# Patient Record
Sex: MALE | Race: NATIVE HAWAIIAN OR OTHER PACIFIC ISLANDER
[De-identification: names, ages, dates, MRNs, and addresses within clinical notes are randomized per-mention and may not be internally consistent; named-entity substitution may affect disease eponyms.]

---

## 2019-07-11 ENCOUNTER — HOSPITAL ENCOUNTER (EMERGENCY)
Dept: HOSPITAL 76 - ED | Age: 39
Discharge: HOME | End: 2019-07-11
Payer: COMMERCIAL

## 2019-07-11 VITALS — SYSTOLIC BLOOD PRESSURE: 127 MMHG | DIASTOLIC BLOOD PRESSURE: 77 MMHG

## 2019-07-11 DIAGNOSIS — N20.0: Primary | ICD-10-CM

## 2019-07-11 LAB
ALBUMIN DIAFP-MCNC: 4.6 G/DL (ref 3.2–5.5)
ALBUMIN/GLOB SERPL: 1.1 {RATIO} (ref 1–2.2)
ALP SERPL-CCNC: 90 IU/L (ref 42–121)
ALT SERPL W P-5'-P-CCNC: 67 IU/L (ref 10–60)
ANION GAP SERPL CALCULATED.4IONS-SCNC: 13 MMOL/L (ref 6–13)
AST SERPL W P-5'-P-CCNC: 35 IU/L (ref 10–42)
BASOPHILS NFR BLD AUTO: 0.1 10^3/UL (ref 0–0.1)
BASOPHILS NFR BLD AUTO: 0.6 %
BILIRUB BLD-MCNC: 1.2 MG/DL (ref 0.2–1)
BUN SERPL-MCNC: 20 MG/DL (ref 6–20)
CALCIUM UR-MCNC: 9.5 MG/DL (ref 8.5–10.3)
CHLORIDE SERPL-SCNC: 105 MMOL/L (ref 101–111)
CLARITY UR REFRACT.AUTO: (no result)
CO2 SERPL-SCNC: 24 MMOL/L (ref 21–32)
CREAT SERPLBLD-SCNC: 1 MG/DL (ref 0.6–1.2)
EOSINOPHIL # BLD AUTO: 0.2 10^3/UL (ref 0–0.7)
EOSINOPHIL NFR BLD AUTO: 2 %
ERYTHROCYTE [DISTWIDTH] IN BLOOD BY AUTOMATED COUNT: 11.7 % (ref 12–15)
GFRSERPLBLD MDRD-ARVRAT: 85 ML/MIN/{1.73_M2} (ref 89–?)
GLOBULIN SER-MCNC: 4 G/DL (ref 2.1–4.2)
GLUCOSE SERPL-MCNC: 130 MG/DL (ref 70–100)
GLUCOSE UR QL STRIP.AUTO: NEGATIVE MG/DL
HGB UR QL STRIP: 14.6 G/DL (ref 14–18)
KETONES UR QL STRIP.AUTO: NEGATIVE MG/DL
LIPASE SERPL-CCNC: 36 U/L (ref 22–51)
LYMPHOCYTES # SPEC AUTO: 3.7 10^3/UL (ref 1.5–3.5)
LYMPHOCYTES NFR BLD AUTO: 43.9 %
MCH RBC QN AUTO: 30.4 PG (ref 27–31)
MCHC RBC AUTO-ENTMCNC: 33.4 G/DL (ref 32–36)
MCV RBC AUTO: 90.9 FL (ref 80–94)
MONOCYTES # BLD AUTO: 0.6 10^3/UL (ref 0–1)
MONOCYTES NFR BLD AUTO: 7.5 %
MUCOUS THREADS URNS QL MICRO: (no result)
NEUTROPHILS # BLD AUTO: 3.8 10^3/UL (ref 1.5–6.6)
NEUTROPHILS # SNV AUTO: 8.3 X10^3/UL (ref 4.8–10.8)
NEUTROPHILS NFR BLD AUTO: 45.6 %
NITRITE UR QL STRIP.AUTO: NEGATIVE
PDW BLD AUTO: 9.5 FL (ref 7.4–11.4)
PH UR STRIP.AUTO: 5.5 PH (ref 5–7.5)
PLATELET # BLD: 366 10^3/UL (ref 130–450)
PROT SPEC-MCNC: 8.6 G/DL (ref 6.7–8.2)
PROT UR STRIP.AUTO-MCNC: 30 MG/DL
RBC # UR STRIP.AUTO: (no result) /UL
RBC # URNS HPF: (no result) /HPF (ref 0–5)
RBC MAR: 4.81 10^6/UL (ref 4.7–6.1)
SODIUM SERPLBLD-SCNC: 142 MMOL/L (ref 135–145)
SP GR UR STRIP.AUTO: >=1.03 (ref 1–1.03)
SQUAMOUS URNS QL MICRO: (no result)
UROBILINOGEN UR QL STRIP.AUTO: (no result) E.U./DL
UROBILINOGEN UR STRIP.AUTO-MCNC: NEGATIVE MG/DL

## 2019-07-11 PROCEDURE — 74176 CT ABD & PELVIS W/O CONTRAST: CPT

## 2019-07-11 PROCEDURE — 96374 THER/PROPH/DIAG INJ IV PUSH: CPT

## 2019-07-11 PROCEDURE — 36415 COLL VENOUS BLD VENIPUNCTURE: CPT

## 2019-07-11 PROCEDURE — 96361 HYDRATE IV INFUSION ADD-ON: CPT

## 2019-07-11 PROCEDURE — 81003 URINALYSIS AUTO W/O SCOPE: CPT

## 2019-07-11 PROCEDURE — 87086 URINE CULTURE/COLONY COUNT: CPT

## 2019-07-11 PROCEDURE — 81001 URINALYSIS AUTO W/SCOPE: CPT

## 2019-07-11 PROCEDURE — 80053 COMPREHEN METABOLIC PANEL: CPT

## 2019-07-11 PROCEDURE — 83690 ASSAY OF LIPASE: CPT

## 2019-07-11 PROCEDURE — 85025 COMPLETE CBC W/AUTO DIFF WBC: CPT

## 2019-07-11 PROCEDURE — 99284 EMERGENCY DEPT VISIT MOD MDM: CPT

## 2019-07-11 NOTE — CT REPORT
Reason:  L flank pain; r/o kidney stone

Procedure Date:  07/11/2019   

Accession Number:  [id number] / [id number]                    

Procedure:  CT  - Abdomen/Pelvis WO CPT Code:  

 

FULL RESULT:

 

 

EXAM:

CT ABDOMEN AND PELVIS (CT KUB)

 

EXAM DATE: 7/11/2019 06:05 AM.

 

CLINICAL HISTORY: Left flank pain; rule out kidney stone.

 

COMPARISONS: None.

 

TECHNIQUE: Routine axial helical CT imaging was performed through the 

abdomen and pelvis without IV contrast. Reconstructions: Coronal and 

sagittal.

 

In accordance with CT protocol optimization, one or more of the following 

dose reduction techniques were utilized for this exam: automated exposure 

control, adjustment of mA and/or KV based on patient size, or use of 

iterative reconstructive technique.

 

FINDINGS:

Lung Bases: Unremarkable with 2 mm right middle lobe scarlike nodular 

focus.

 

Right Kidney/Ureter: No stones, hydronephrosis, or hydroureter.

 

Left Kidney/Ureter: Nonobstructing 4 x 2 mm proximal to mid left ureteral 

stone. Otherwise grossly unremarkable.

 

Other Abdominal Organs: Noncontrast images of the abdominal organs are 

grossly unremarkable with exception of fatty liver.

 

Peritoneal Cavity: No free fluid, free air or maggy adenopathy. No 

excessive stool burden. Bowel is grossly unremarkable.

 

Pelvic Organs: No bladder stones or gross wall thickening. Noncontrast 

images of the visualized pelvic organs are unremarkable.

 

Vasculature: Unremarkable.

 

Other: None.

IMPRESSION:

1. Nonobstructing 4 x 2 mm proximal to mid left ureteral stone.

2. Fatty liver.

 

RADIA

## 2025-02-23 NOTE — ED PHYSICIAN DOCUMENTATION
PD HPI ABD PAIN





- Stated complaint


Stated Complaint: ABD/BACK PAIN





- History obtained from


History obtained from: Patient





- History of Present Illness


Timing - onset: How many hours ago (1.5)


Timing - duration: Hours (1.5)


Timing - details: Abrupt onset


Pain level max: 10


Pain level now: 8


Quality: Sharp


Location: LUQ, LLQ


Radiation: Left flank


Associated symptoms: Hematochezia (2 weeks ago he saw bright red blood on his 

stool about 3 times .).  No: Fever, Nausea, Vomiting, Diarrhea, Constipation, 

Dysuria, Hematuria, Chest pain, Dizzy, Near syncope / syncope


Similar symptoms before: Has not had sx before





- Additional information


Additional information: 





This is a 38-year-old man who presents with his wife complains that he was 

awakened an hour and a half prior to presentation with complaints of pain in his

left abdomen with that was sharp and radiating to the left flank.  Is a constant

pain that was a 10 out of 10 it slightly improved now with a 7-8 out of 10.  He 

has not taken any medications for it.  He did urinate at home the urine was very

dark but no maggy blood in it.  He was sweaty on the way here but denies fever. 

He has had no nausea vomiting or diarrhea.  Approximately 2 weeks ago the 

patient noticed some bright red blood in his stools 3 times.  Denied 

constipation and has not seen any further episodes of bloody stool.  Is never 

had a colonoscopy.  Denies history of kidney stones.  The pain does not radiate 

down his leg.  Patient is  and is in the [employer].





Review of Systems


Constitutional: reports: Sweats.  denies: Fever


Eyes: denies: Decreased vision


Cardiac: denies: Chest pain / pressure, Palpitations


Respiratory: denies: Dyspnea, Cough


GI: reports: Abdominal Pain.  denies: Nausea, Vomiting, Diarrhea, Hematemesis


: denies: Dysuria, Frequency, Hesitancy


Skin: denies: Rash


Musculoskeletal: reports: Back pain


Endocrine: reports: Other (Not diabetic)





PD PAST MEDICAL HISTORY





- Present Medications


Home Medications: 


                                Ambulatory Orders











 Medication  Instructions  Recorded  Confirmed


 


Hydrocodone/Acetaminophen 1 - 2 each PO Q6H PRN #14 tablet 07/11/19 





[Hydrocodon-Acetaminophen 5-325]   


 


Ibuprofen [Motrin] 800 mg PO Q8H PRN #30 tablet 07/11/19 


 


Tamsulosin [Flomax] 0.4 mg PO DAILY #10 capsule 07/11/19 














- Allergies


Allergies/Adverse Reactions: 


                                    Allergies











Allergy/AdvReac Type Severity Reaction Status Date / Time


 


No Known Drug Allergies Allergy   Verified 07/11/19 04:50














PD ED PE NORMAL





- Vitals


Vital signs reviewed: Yes





- General


General: Alert and oriented X 3, Other (Appears uncomfortable.)





- HEENT


HEENT: Atraumatic, PERRL





- Neck


Neck: Thyroid normal, No JVD





- Cardiac


Cardiac: RRR, No murmur





- Respiratory


Respiratory: No respiratory distress, Clear bilaterally





- Abdomen


Abdomen: Normal bowel sounds, Soft, Other (He has voluntary guarding with 

tenderness along the left abdomen.)





- Back


Back: Other (There is left costovertebral angle tenderness.)





- Derm


Derm: Normal color, Warm and dry, No rash





- Extremities


Extremities: No deformity, No edema





- Neuro


Neuro: Alert and oriented X 3, CNs 2-12 intact, No motor deficit, No sensory 

deficit, Normal speech





Results





- Vitals


Vitals: 


                               Vital Signs - 24 hr











  07/11/19 07/11/19





  04:40 05:23


 


Temperature 36.3 C L 


 


Heart Rate 73 70


 


Respiratory 20 14





Rate  


 


Blood Pressure 147/97 H 125/85 H


 


O2 Saturation 97 97








                                     Oxygen











O2 Source                      Room air

















- Labs


Labs: 


                                Laboratory Tests











  07/11/19 07/11/19 07/11/19





  04:50 04:50 05:09


 


WBC  8.3  


 


RBC  4.81  


 


Hgb  14.6  


 


Hct  43.7  


 


MCV  90.9  


 


MCH  30.4  


 


MCHC  33.4  


 


RDW  11.7 L  


 


Plt Count  366  


 


MPV  9.5  


 


Neut # (Auto)  3.8  


 


Lymph # (Auto)  3.7 H  


 


Mono # (Auto)  0.6  


 


Eos # (Auto)  0.2  


 


Baso # (Auto)  0.1  


 


Absolute Nucleated RBC  0.00  


 


Nucleated RBC %  0.0  


 


Sodium   142 


 


Potassium   3.7 


 


Chloride   105 


 


Carbon Dioxide   24 


 


Anion Gap   13.0 


 


BUN   20 


 


Creatinine   1.0 


 


Estimated GFR (MDRD)   85 L 


 


Glucose   130 H 


 


Calcium   9.5 


 


Total Bilirubin   1.2 H 


 


AST   35 


 


ALT   67 H 


 


Alkaline Phosphatase   90 


 


Total Protein   8.6 H 


 


Albumin   4.6 


 


Globulin   4.0 


 


Albumin/Globulin Ratio   1.1 


 


Lipase   36 


 


Urine Color    YELLOW


 


Urine Clarity    HAZY


 


Urine pH    5.5


 


Ur Specific Gravity    >=1.030 H


 


Urine Protein    30 H


 


Urine Glucose (UA)    NEGATIVE


 


Urine Ketones    NEGATIVE


 


Urine Occult Blood    LARGE H


 


Urine Nitrite    NEGATIVE


 


Urine Bilirubin    NEGATIVE


 


Urine Urobilinogen    0.2 (NORMAL)


 


Ur Leukocyte Esterase    TRACE H


 


Urine RBC    TNTC H


 


Urine WBC    0-3


 


Ur Squamous Epith Cells    NONE SEEN


 


Urine Bacteria    Rare


 


Urine Mucus    Few Strands


 


Ur Microscopic Review    INDICATED


 


Urine Culture Comments    INDICATED














PD MEDICAL DECISION MAKING





- ED course


Complexity details: reviewed results, re-evaluated patient, d/w patient


ED course: 





Patient was given a liter of fluids and 30 of Toradol IV.  His pain was down to 

a 2 out of 10 on reevaluation.  White blood cell count is normal.  He has too 

numerous to count red blood cells in his urine specimen.  Normal BUN and cre

atinine.  Noncontrast abdomen pelvis CT has been obtained and additional liter 

of saline has been ordered.  We will strain all urine.





0620:  CT shows 4mm stone in L mid-ureter.  Patient is pain-free.  He did strain

 his urine here has not passed the stone.  We discussed kidney stones and 

treatment.  We will discharge him with instructions to take ibuprofen for the 

pain.  Also prescription for Flomax and a few hydrocodone tablets if needed the 

pain returns.  He should make a follow-up appointment with his primary care 

provider.  Return to the emergency department if he has increasing pain that is 

not controlled with the pain medicine, he is vomiting and cannot keep anything 

down, he has a fever or not urinating.





Departure





- Departure


Disposition: 01 Home, Self Care


Clinical Impression: 


 Renal colic





Condition: Good


Instructions:  ED Stone Renal W Colic


Follow-Up: 


[facility] [Provider Group]


Prescriptions: 


Hydrocodone/Acetaminophen [Hydrocodon-Acetaminophen 5-325] 1 - 2 each PO Q6H PRN

#14 tablet


 PRN Reason: pain


Ibuprofen [Motrin] 800 mg PO Q8H PRN #30 tablet


 PRN Reason: PAIN &/OR FEVER


Tamsulosin [Flomax] 0.4 mg PO DAILY #10 capsule


Comments: 


Be sure to drink plenty of water.  Take Motrin every 8 hours with food for the 

pain.  Use hydrocodone if needed for the pain but do not drive or operate 

machinery or take additional Tylenol if you are taking that medicine.  Strain 

all your urine and save the stone if passed.  Take the Flomax daily until the 

stone has passed.  Schedule follow-up appointment on the [facility] base for 

reevaluation especially if the stone has not passed in 2 weeks.  Return to the 

emergency department if you have increasing pain that is not controlled with the

 pain medicine, you are vomiting and cannot keep anything down, you develop a 

fever or are unable to urinate. The patient is a 34y Female complaining of shortness of breath.